# Patient Record
Sex: MALE | Race: WHITE
[De-identification: names, ages, dates, MRNs, and addresses within clinical notes are randomized per-mention and may not be internally consistent; named-entity substitution may affect disease eponyms.]

---

## 2019-12-22 ENCOUNTER — HOSPITAL ENCOUNTER (EMERGENCY)
Dept: HOSPITAL 94 - ER | Age: 19
LOS: 1 days | Discharge: PSYCHIATRIC OR SUBSTANCE USE FACILITY | End: 2019-12-23
Payer: MEDICAID

## 2019-12-22 VITALS — WEIGHT: 122.91 LBS | HEIGHT: 68 IN | BODY MASS INDEX: 18.63 KG/M2

## 2019-12-22 DIAGNOSIS — R45.851: Primary | ICD-10-CM

## 2019-12-22 DIAGNOSIS — Z88.5: ICD-10-CM

## 2019-12-22 DIAGNOSIS — Z88.8: ICD-10-CM

## 2019-12-22 DIAGNOSIS — F12.90: ICD-10-CM

## 2019-12-22 DIAGNOSIS — Z91.040: ICD-10-CM

## 2019-12-22 LAB
ALBUMIN SERPL BCP-MCNC: 4.8 G/DL (ref 3.4–5)
ALBUMIN/GLOB SERPL: 1.3 {RATIO} (ref 1.1–1.5)
ALP SERPL-CCNC: 83 IU/L (ref 20–180)
ALT SERPL W P-5'-P-CCNC: 40 U/L (ref 12–78)
AMPHETAMINES UR QL SCN: NEGATIVE
ANION GAP SERPL CALCULATED.3IONS-SCNC: 8 MMOL/L (ref 8–16)
AST SERPL W P-5'-P-CCNC: 37 U/L (ref 10–37)
BARBITURATES UR QL SCN: NEGATIVE
BASOPHILS # BLD AUTO: 0 X10'3 (ref 0–0.2)
BASOPHILS NFR BLD AUTO: 0.2 % (ref 0–1)
BENZODIAZ UR QL SCN: NEGATIVE
BILIRUB SERPL-MCNC: 0.9 MG/DL (ref 0.1–1)
BUN SERPL-MCNC: 11 MG/DL (ref 7–18)
BUN/CREAT SERPL: 10.3 (ref 5.4–32)
BZE UR QL SCN: NEGATIVE
CALCIUM SERPL-MCNC: 8.7 MG/DL (ref 8.5–10.1)
CANNABINOIDS UR QL SCN: POSITIVE
CHLORIDE SERPL-SCNC: 101 MMOL/L (ref 99–107)
CLARITY UR: CLEAR
CO2 SERPL-SCNC: 28 MMOL/L (ref 24–32)
COLOR UR: (no result)
CREAT SERPL-MCNC: 1.07 MG/DL (ref 0.6–1.1)
EOSINOPHIL # BLD AUTO: 0.1 X10'3 (ref 0–0.9)
EOSINOPHIL NFR BLD AUTO: 1 % (ref 0–6)
ERYTHROCYTE [DISTWIDTH] IN BLOOD BY AUTOMATED COUNT: 12.4 % (ref 11.5–14.5)
ETHANOL SERPL-MCNC: < 0.01 GM/DL (ref 0–0.01)
GFR SERPL CREATININE-BSD FRML MDRD: 88 ML/MIN
GLUCOSE SERPL-MCNC: 115 MG/DL (ref 70–104)
GLUCOSE UR STRIP-MCNC: NEGATIVE MG/DL
HCT VFR BLD AUTO: 47.1 % (ref 42–52)
HGB BLD-MCNC: 16.5 G/DL (ref 14–17.9)
HGB UR QL STRIP: NEGATIVE
KETONES UR STRIP-MCNC: NEGATIVE MG/DL
LEUKOCYTE ESTERASE UR QL STRIP: NEGATIVE
LYMPHOCYTES # BLD AUTO: 1.8 X10'3 (ref 1.1–4.8)
LYMPHOCYTES NFR BLD AUTO: 30.1 % (ref 21–51)
MCH RBC QN AUTO: 31.6 PG (ref 27–31)
MCHC RBC AUTO-ENTMCNC: 35 G/DL (ref 33–36.5)
MCV RBC AUTO: 90.3 FL (ref 78–98)
METHADONE UR QL SCN: NEGATIVE
MONOCYTES # BLD AUTO: 0.5 X10'3 (ref 0–0.9)
MONOCYTES NFR BLD AUTO: 8.9 % (ref 2–12)
NEUTROPHILS # BLD AUTO: 3.7 X10'3 (ref 1.8–7.7)
NEUTROPHILS NFR BLD AUTO: 59.8 % (ref 42–75)
NITRITE UR QL STRIP: NEGATIVE
OPIATES UR QL SCN: NEGATIVE
PCP UR QL SCN: NEGATIVE
PH UR STRIP: 6 [PH] (ref 4.8–8)
PLATELET # BLD AUTO: 283 X10'3 (ref 140–440)
PMV BLD AUTO: 7.4 FL (ref 7.4–10.4)
POTASSIUM SERPL-SCNC: 3.3 MMOL/L (ref 3.5–5.1)
PROT SERPL-MCNC: 8.4 G/DL (ref 6.4–8.2)
PROT UR QL STRIP: NEGATIVE MG/DL
RBC # BLD AUTO: 5.21 X10'6 (ref 4.7–6.1)
SODIUM SERPL-SCNC: 137 MMOL/L (ref 135–145)
SP GR UR STRIP: 1.01 (ref 1–1.03)
URN COLLECT METHOD CLASS: (no result)
UROBILINOGEN UR STRIP-MCNC: 0.2 E.U/DL (ref 0.2–1)
WBC # BLD AUTO: 6.1 X10'3 (ref 4.5–11)

## 2019-12-22 PROCEDURE — 80053 COMPREHEN METABOLIC PANEL: CPT

## 2019-12-22 PROCEDURE — 80305 DRUG TEST PRSMV DIR OPT OBS: CPT

## 2019-12-22 PROCEDURE — 81003 URINALYSIS AUTO W/O SCOPE: CPT

## 2019-12-22 PROCEDURE — 36415 COLL VENOUS BLD VENIPUNCTURE: CPT

## 2019-12-22 PROCEDURE — 85025 COMPLETE CBC W/AUTO DIFF WBC: CPT

## 2019-12-22 PROCEDURE — 80320 DRUG SCREEN QUANTALCOHOLS: CPT

## 2019-12-22 PROCEDURE — 99285 EMERGENCY DEPT VISIT HI MDM: CPT

## 2019-12-22 PROCEDURE — 84443 ASSAY THYROID STIM HORMONE: CPT

## 2019-12-22 NOTE — NUR
Patient low fowlers in bed. Patients mother sitting at bedside. Patient is 
alert and oriented X4. He is cooperative with staff. Patient has been supplied 
with a meal.

## 2019-12-22 NOTE — NUR
Patient speaks quietly to this writer. Patient admits to S/I, he states his 
plan would be to use a gun. Patient denies owning a gun, "but I know how to get 
one!" Patient admits to recent depression. He denies voices or other 
hallucinations. Patient denies taking any home medications.

## 2019-12-22 NOTE — NUR
Patient sleeping on his left side in bed. In view from nursing station. 
Frequent rounding being done for patient safety.

## 2019-12-23 VITALS — DIASTOLIC BLOOD PRESSURE: 81 MMHG | SYSTOLIC BLOOD PRESSURE: 123 MMHG

## 2019-12-23 NOTE — NUR
TC FROM [facility] STATING THAT HE HAS BEEN ACCEPTED BY [facility] 
[facility] IN [place]. TRANSPORTATION TIME SCHEDULED FOR 1930 TONIGHT. PATIENT'S 
MOTHER, ARACELY NOTIFIED OF TRANSPORTATION TIME AND SHE WILL BE BRINGING CLOTHES 
AND SLIPPERS FOR HIM TO WEAR.

## 2019-12-23 NOTE — NUR
Patients Mother is at bedside. She brought in clean street clothes for him at 
[facility].

## 2019-12-23 NOTE — NUR
MOTHER AND SISTER AT THE BEDSIDE, BUT PREPARING TO LEAVE SHORTLY. PATIENT 
STATES THAT NURSE IS ABLE TO GIVE HIS MOTHER, ARACELY INFORMATION REGARDING HIS 
PLACEMENT/TREATMENT PLAN.

## 2020-11-16 ENCOUNTER — HOSPITAL ENCOUNTER (EMERGENCY)
Dept: HOSPITAL 94 - ER | Age: 20
Discharge: HOME | End: 2020-11-16
Payer: MEDICAID

## 2020-11-16 VITALS — WEIGHT: 132.28 LBS | BODY MASS INDEX: 20.05 KG/M2 | HEIGHT: 68 IN

## 2020-11-16 VITALS — SYSTOLIC BLOOD PRESSURE: 118 MMHG | DIASTOLIC BLOOD PRESSURE: 79 MMHG

## 2020-11-16 DIAGNOSIS — Z88.8: ICD-10-CM

## 2020-11-16 DIAGNOSIS — J02.8: Primary | ICD-10-CM

## 2020-11-16 DIAGNOSIS — F12.10: ICD-10-CM

## 2020-11-16 DIAGNOSIS — Z88.5: ICD-10-CM

## 2020-11-16 DIAGNOSIS — Z91.040: ICD-10-CM

## 2020-11-16 PROCEDURE — 99283 EMERGENCY DEPT VISIT LOW MDM: CPT

## 2020-11-16 PROCEDURE — 87880 STREP A ASSAY W/OPTIC: CPT

## 2020-11-16 PROCEDURE — 87081 CULTURE SCREEN ONLY: CPT

## 2020-11-16 PROCEDURE — 36415 COLL VENOUS BLD VENIPUNCTURE: CPT

## 2021-01-05 ENCOUNTER — HOSPITAL ENCOUNTER (EMERGENCY)
Dept: HOSPITAL 94 - ER | Age: 21
Discharge: HOME | End: 2021-01-05
Payer: MEDICAID

## 2021-01-05 VITALS — BODY MASS INDEX: 24.39 KG/M2 | WEIGHT: 170.35 LBS | HEIGHT: 70 IN

## 2021-01-05 DIAGNOSIS — J45.909: ICD-10-CM

## 2021-01-05 DIAGNOSIS — Z79.899: ICD-10-CM

## 2021-01-05 DIAGNOSIS — F12.90: ICD-10-CM

## 2021-01-05 DIAGNOSIS — Z91.040: ICD-10-CM

## 2021-01-05 DIAGNOSIS — Z88.8: ICD-10-CM

## 2021-01-05 DIAGNOSIS — J02.9: Primary | ICD-10-CM

## 2021-01-05 DIAGNOSIS — F17.200: ICD-10-CM

## 2021-01-05 DIAGNOSIS — Z20.828: ICD-10-CM

## 2021-01-05 PROCEDURE — 87081 CULTURE SCREEN ONLY: CPT

## 2021-01-05 PROCEDURE — 99283 EMERGENCY DEPT VISIT LOW MDM: CPT

## 2021-01-05 PROCEDURE — 36415 COLL VENOUS BLD VENIPUNCTURE: CPT

## 2021-01-05 PROCEDURE — 87880 STREP A ASSAY W/OPTIC: CPT

## 2021-01-18 ENCOUNTER — HOSPITAL ENCOUNTER (EMERGENCY)
Dept: HOSPITAL 94 - ER | Age: 21
Discharge: HOME | End: 2021-01-18
Payer: MEDICAID

## 2021-01-18 VITALS — BODY MASS INDEX: 24.39 KG/M2 | WEIGHT: 170.35 LBS | HEIGHT: 70 IN

## 2021-01-18 DIAGNOSIS — U07.1: Primary | ICD-10-CM

## 2021-01-18 DIAGNOSIS — F12.90: ICD-10-CM

## 2021-01-18 DIAGNOSIS — Z91.040: ICD-10-CM

## 2021-01-18 DIAGNOSIS — Z79.899: ICD-10-CM

## 2021-01-18 PROCEDURE — 99283 EMERGENCY DEPT VISIT LOW MDM: CPT

## 2021-01-18 PROCEDURE — 36415 COLL VENOUS BLD VENIPUNCTURE: CPT

## 2021-01-18 PROCEDURE — 87635 SARS-COV-2 COVID-19 AMP PRB: CPT

## 2021-08-11 ENCOUNTER — HOSPITAL ENCOUNTER (EMERGENCY)
Dept: HOSPITAL 94 - ER | Age: 21
Discharge: HOME | End: 2021-08-11
Payer: MEDICAID

## 2021-08-11 VITALS — HEIGHT: 70 IN | BODY MASS INDEX: 23.67 KG/M2 | WEIGHT: 165.35 LBS

## 2021-08-11 VITALS — SYSTOLIC BLOOD PRESSURE: 111 MMHG | DIASTOLIC BLOOD PRESSURE: 76 MMHG

## 2021-08-11 DIAGNOSIS — J45.909: ICD-10-CM

## 2021-08-11 DIAGNOSIS — Z88.8: ICD-10-CM

## 2021-08-11 DIAGNOSIS — Z20.822: ICD-10-CM

## 2021-08-11 DIAGNOSIS — Z79.899: ICD-10-CM

## 2021-08-11 DIAGNOSIS — J02.0: Primary | ICD-10-CM

## 2021-08-11 DIAGNOSIS — Z91.040: ICD-10-CM

## 2021-08-11 DIAGNOSIS — F12.90: ICD-10-CM

## 2021-08-11 PROCEDURE — 87880 STREP A ASSAY W/OPTIC: CPT

## 2021-08-11 PROCEDURE — 96372 THER/PROPH/DIAG INJ SC/IM: CPT

## 2021-08-11 PROCEDURE — 87635 SARS-COV-2 COVID-19 AMP PRB: CPT

## 2021-08-11 PROCEDURE — 99283 EMERGENCY DEPT VISIT LOW MDM: CPT

## 2022-01-02 ENCOUNTER — HOSPITAL ENCOUNTER (EMERGENCY)
Dept: HOSPITAL 94 - ER | Age: 22
Discharge: HOME | End: 2022-01-02
Payer: MEDICAID

## 2022-01-02 VITALS — SYSTOLIC BLOOD PRESSURE: 150 MMHG | DIASTOLIC BLOOD PRESSURE: 105 MMHG

## 2022-01-02 VITALS — WEIGHT: 175.38 LBS | HEIGHT: 71 IN | BODY MASS INDEX: 24.55 KG/M2

## 2022-01-02 DIAGNOSIS — J45.909: ICD-10-CM

## 2022-01-02 DIAGNOSIS — F12.90: ICD-10-CM

## 2022-01-02 DIAGNOSIS — J06.9: Primary | ICD-10-CM

## 2022-01-02 DIAGNOSIS — Z91.040: ICD-10-CM

## 2022-01-02 DIAGNOSIS — Z72.89: ICD-10-CM

## 2022-01-02 DIAGNOSIS — Z88.8: ICD-10-CM

## 2022-01-02 DIAGNOSIS — Z20.822: ICD-10-CM

## 2022-01-02 DIAGNOSIS — Z79.899: ICD-10-CM

## 2022-01-02 DIAGNOSIS — F17.200: ICD-10-CM

## 2022-01-02 DIAGNOSIS — R11.0: ICD-10-CM

## 2022-01-02 PROCEDURE — 36415 COLL VENOUS BLD VENIPUNCTURE: CPT

## 2022-01-02 PROCEDURE — 99283 EMERGENCY DEPT VISIT LOW MDM: CPT
